# Patient Record
Sex: MALE | Race: WHITE | NOT HISPANIC OR LATINO
[De-identification: names, ages, dates, MRNs, and addresses within clinical notes are randomized per-mention and may not be internally consistent; named-entity substitution may affect disease eponyms.]

---

## 2022-04-06 ENCOUNTER — TRANSCRIPTION ENCOUNTER (OUTPATIENT)
Age: 52
End: 2022-04-06

## 2022-04-09 ENCOUNTER — TRANSCRIPTION ENCOUNTER (OUTPATIENT)
Age: 52
End: 2022-04-09

## 2023-01-03 ENCOUNTER — NON-APPOINTMENT (OUTPATIENT)
Age: 53
End: 2023-01-03

## 2023-01-06 ENCOUNTER — APPOINTMENT (OUTPATIENT)
Dept: ORTHOPEDIC SURGERY | Facility: CLINIC | Age: 53
End: 2023-01-06
Payer: COMMERCIAL

## 2023-01-06 VITALS
SYSTOLIC BLOOD PRESSURE: 117 MMHG | BODY MASS INDEX: 25.06 KG/M2 | HEIGHT: 72 IN | OXYGEN SATURATION: 99 % | HEART RATE: 57 BPM | TEMPERATURE: 97.2 F | DIASTOLIC BLOOD PRESSURE: 75 MMHG | WEIGHT: 185 LBS

## 2023-01-06 DIAGNOSIS — M76.30 ILIOTIBIAL BAND SYNDROME, UNSPECIFIED LEG: ICD-10-CM

## 2023-01-06 DIAGNOSIS — M25.562 PAIN IN LEFT KNEE: ICD-10-CM

## 2023-01-06 PROCEDURE — 99203 OFFICE O/P NEW LOW 30 MIN: CPT

## 2023-01-06 PROCEDURE — 73564 X-RAY EXAM KNEE 4 OR MORE: CPT | Mod: LT

## 2023-01-06 PROCEDURE — 72170 X-RAY EXAM OF PELVIS: CPT

## 2023-01-11 PROBLEM — M25.562 LEFT KNEE PAIN: Status: ACTIVE | Noted: 2023-01-06

## 2023-01-11 NOTE — REVIEW OF SYSTEMS
[Joint Pain] : joint pain [Negative] : Endocrine [Joint Stiffness] : no joint stiffness [Joint Swelling] : no joint swelling [Fever] : no fever [Chills] : no chills [FreeTextEntry7] : History of Celiac Disease [FreeTextEntry8] : History of Nephrolithiasis [de-identified] : History of Ankylosing Spondylitis

## 2023-01-11 NOTE — PHYSICAL EXAM
[de-identified] : Constitutional: 52 year old male, alert and oriented, cooperative, in no acute distress.\par \par HEENT \par NC/AT.  Appearance: symmetric\par \par Neck/Back\par Straight without deformity or instability.  Good ROM.\par \par Chest/Respiratory \par Respiratory effort: no intercostal retractions or use of accessory muscles. Nonlabored Breathing\par \par Skin \par On inspection, warm and dry without rashes or lesions.\par \par Mental Status: \par Judgment, insight: intact\par Orientation: oriented to time, place, and person\par \par Neurological:\par Sensory and Motor are grossly intact throughout\par \par Left Knee\par \par Inspection:\par     Skin intact, no rashes or lesions\par     No Effusion\par     Non-tender to palpation over tibial tubercle, patella, medial and lateral joint line, and pes insertion.\par     Mild tenderness over the IT band near the knee and Gerdy's Tubercle\par \par Range of Motion:\par 	Extension - 0 degrees\par 	Flexion - 120 degrees\par 	Extensor lag: None\par \par Stability:\par      Demonstrates no Varus or Valgus instability\par      Negative Anterior or Posterior drawer.\par      Negative Lachman's\par \par Patella: stable, tracks well. \par \par Neurologic Exam\par     Motor intact including 5/5 Extensor Hallucis Longus, 5/5 Flexor Hallucis Longus, 5/5 Tibialis Anterior and 5/5 Gastrocnemius\par     Sensation Intact to Light Touch including Saphenous, Sural, Superficial Peroneal, Deep Peroneal, Tibial nerve distributions\par \par Vascular Exam\par     Foot is warm and well perfused with 2+ Dorsalis Pedis Pulse \par \par No pain with range of motion of the bilateral hips or right knee. No lumbar paraspinal muscle tenderness. \par Mild "tightness" over proximal IT band near greater trochanter [de-identified] : XRay:  XRays of the Pelvis (1 View) taken in the office today and discussed with the patient. XRays demonstrate no obvious fracture or dislocation. There is joint space narrowing and osteophyte formation in the bilateral hips. (my personal interpretation). \par \par XRay: XRays of the Left  Knee (4 Views) taken in the office today and reviewed with the patient. XRays demonstrate mild joint space narrowing in the medial compartment, consistent with mild osteoarthritis, KL ndGndrndanddndend:nd nd2nd. There is a small patella osteophyte on the lateral facet. (my personal interpretation)

## 2023-01-11 NOTE — DISCUSSION/SUMMARY
[de-identified] : Mr. Sherwood is a 52-year-old male who presented to the office for evaluation of left knee pain.  Patient has been experiencing left knee pain for the last 2 months.  He is an avid runner and has not run since that time.  Patient has rested the knee, which has improved the pain.  X-rays showed mild left knee joint space narrowing.  Examination showed tenderness/tightness over the IT band, both proximally and distally near Gerdy's tubercle.  Discussed with patient the examination and imaging findings.  Discussed with patient the potential etiologies of his knee pain including, but not limited to, IT band syndrome, IT band injury, left knee sprain, left knee strain, and left knee osteoarthritis.  Discussed with patient the management of his knee pain, including physical therapy and anti-inflammatories.  Patient was given referral for physical therapy.  He is recommended to continue home exercises.  Patient will take over-the-counter anti-inflammatories as needed.  He will follow-up in 3 months or as needed for reevaluation management of his left knee pain.  Patient understanding and in agreement with the plan.  All questions answered.\par \par Plan:\par - Physical Therapy for left knee pain\par - Over the counter anti-inflammatories as needed\par - Home Exercises\par - Follow up in 3 months or as needed for re-evaluation and management of left knee pain

## 2023-01-11 NOTE — HISTORY OF PRESENT ILLNESS
[de-identified] : Mr. Sherwood is a 52-year-old male present to the office for evaluation of his left knee pain.  Patient has a history of left knee pain for the last 2 months.  Patient is an avid runner and stopped running at the onset of his knee pain.  Pain is located over the anterior knee, near the tibial tubercle and over the lateral knee/IT band.  Pain has improved with resting for the last 2 months and patient has not run since that time.  There is no known injury.  Patient does have a history of shinsplints in the past, but states that this pain feels different.  He has not tried braces.  He has not tried physical therapy.  Patient does have a history of ankylosing spondylitis and a history of SI joint pain.  No recent trauma.  No fevers or chills.